# Patient Record
Sex: MALE | Race: WHITE | Employment: STUDENT | URBAN - METROPOLITAN AREA
[De-identification: names, ages, dates, MRNs, and addresses within clinical notes are randomized per-mention and may not be internally consistent; named-entity substitution may affect disease eponyms.]

---

## 2017-01-30 ENCOUNTER — HOSPITAL ENCOUNTER (EMERGENCY)
Facility: CLINIC | Age: 27
Discharge: HOME | End: 2017-01-30
Attending: PHYSICIAN ASSISTANT | Admitting: PHYSICIAN ASSISTANT
Payer: COMMERCIAL

## 2017-01-30 VITALS
DIASTOLIC BLOOD PRESSURE: 78 MMHG | SYSTOLIC BLOOD PRESSURE: 124 MMHG | BODY MASS INDEX: 25.92 KG/M2 | RESPIRATION RATE: 16 BRPM | HEART RATE: 95 BPM | TEMPERATURE: 98.2 F | WEIGHT: 175 LBS | OXYGEN SATURATION: 97 % | HEIGHT: 69 IN

## 2017-01-30 DIAGNOSIS — F11.10 HEROIN ABUSE (H): ICD-10-CM

## 2017-01-30 LAB
ALBUMIN SERPL-MCNC: 4.2 G/DL (ref 3.4–5)
ALP SERPL-CCNC: 93 U/L (ref 40–150)
ALT SERPL W P-5'-P-CCNC: 11 U/L (ref 0–70)
AMPHETAMINES UR QL SCN: ABNORMAL
ANION GAP SERPL CALCULATED.3IONS-SCNC: 8 MMOL/L (ref 3–14)
AST SERPL W P-5'-P-CCNC: 14 U/L (ref 0–45)
BARBITURATES UR QL: ABNORMAL
BASOPHILS # BLD AUTO: 0 10E9/L (ref 0–0.2)
BASOPHILS NFR BLD AUTO: 0.5 %
BENZODIAZ UR QL: ABNORMAL
BILIRUB SERPL-MCNC: 0.5 MG/DL (ref 0.2–1.3)
BUN SERPL-MCNC: 9 MG/DL (ref 7–30)
CALCIUM SERPL-MCNC: 9 MG/DL (ref 8.5–10.1)
CANNABINOIDS UR QL SCN: ABNORMAL
CHLORIDE SERPL-SCNC: 105 MMOL/L (ref 94–109)
CO2 SERPL-SCNC: 26 MMOL/L (ref 20–32)
COCAINE UR QL: ABNORMAL
CREAT SERPL-MCNC: 0.8 MG/DL (ref 0.66–1.25)
DIFFERENTIAL METHOD BLD: ABNORMAL
EOSINOPHIL # BLD AUTO: 0.1 10E9/L (ref 0–0.7)
EOSINOPHIL NFR BLD AUTO: 0.6 %
ERYTHROCYTE [DISTWIDTH] IN BLOOD BY AUTOMATED COUNT: 12.2 % (ref 10–15)
ETHANOL SERPL-MCNC: <0.01 G/DL
GFR SERPL CREATININE-BSD FRML MDRD: ABNORMAL ML/MIN/1.7M2
GLUCOSE SERPL-MCNC: 101 MG/DL (ref 70–99)
HCT VFR BLD AUTO: 39.5 % (ref 40–53)
HGB BLD-MCNC: 13.8 G/DL (ref 13.3–17.7)
IMM GRANULOCYTES # BLD: 0 10E9/L (ref 0–0.4)
IMM GRANULOCYTES NFR BLD: 0.2 %
INTERPRETATION ECG - MUSE: NORMAL
LYMPHOCYTES # BLD AUTO: 1.7 10E9/L (ref 0.8–5.3)
LYMPHOCYTES NFR BLD AUTO: 20.5 %
MCH RBC QN AUTO: 29.8 PG (ref 26.5–33)
MCHC RBC AUTO-ENTMCNC: 34.9 G/DL (ref 31.5–36.5)
MCV RBC AUTO: 85 FL (ref 78–100)
MONOCYTES # BLD AUTO: 0.6 10E9/L (ref 0–1.3)
MONOCYTES NFR BLD AUTO: 7.4 %
NEUTROPHILS # BLD AUTO: 5.7 10E9/L (ref 1.6–8.3)
NEUTROPHILS NFR BLD AUTO: 70.8 %
NRBC # BLD AUTO: 0 10*3/UL
NRBC BLD AUTO-RTO: 0 /100
OPIATES UR QL SCN: ABNORMAL
PCP UR QL SCN: ABNORMAL
PLATELET # BLD AUTO: 419 10E9/L (ref 150–450)
POTASSIUM SERPL-SCNC: 3.8 MMOL/L (ref 3.4–5.3)
PROT SERPL-MCNC: 7.5 G/DL (ref 6.8–8.8)
RBC # BLD AUTO: 4.63 10E12/L (ref 4.4–5.9)
SODIUM SERPL-SCNC: 139 MMOL/L (ref 133–144)
TROPONIN I SERPL-MCNC: NORMAL UG/L (ref 0–0.04)
WBC # BLD AUTO: 8.1 10E9/L (ref 4–11)

## 2017-01-30 PROCEDURE — 25000132 ZZH RX MED GY IP 250 OP 250 PS 637: Performed by: PHYSICIAN ASSISTANT

## 2017-01-30 PROCEDURE — 80053 COMPREHEN METABOLIC PANEL: CPT | Performed by: PHYSICIAN ASSISTANT

## 2017-01-30 PROCEDURE — 80320 DRUG SCREEN QUANTALCOHOLS: CPT | Performed by: PHYSICIAN ASSISTANT

## 2017-01-30 PROCEDURE — 85025 COMPLETE CBC W/AUTO DIFF WBC: CPT | Performed by: PHYSICIAN ASSISTANT

## 2017-01-30 PROCEDURE — 99285 EMERGENCY DEPT VISIT HI MDM: CPT | Mod: 25

## 2017-01-30 PROCEDURE — 25000125 ZZHC RX 250: Performed by: PHYSICIAN ASSISTANT

## 2017-01-30 PROCEDURE — 96361 HYDRATE IV INFUSION ADD-ON: CPT

## 2017-01-30 PROCEDURE — 96374 THER/PROPH/DIAG INJ IV PUSH: CPT

## 2017-01-30 PROCEDURE — 93005 ELECTROCARDIOGRAM TRACING: CPT

## 2017-01-30 PROCEDURE — 84484 ASSAY OF TROPONIN QUANT: CPT | Performed by: PHYSICIAN ASSISTANT

## 2017-01-30 PROCEDURE — 80307 DRUG TEST PRSMV CHEM ANLYZR: CPT | Performed by: PHYSICIAN ASSISTANT

## 2017-01-30 PROCEDURE — 96375 TX/PRO/DX INJ NEW DRUG ADDON: CPT

## 2017-01-30 PROCEDURE — 25000128 H RX IP 250 OP 636: Performed by: PHYSICIAN ASSISTANT

## 2017-01-30 RX ORDER — LORAZEPAM 2 MG/ML
1 INJECTION INTRAMUSCULAR ONCE
Status: COMPLETED | OUTPATIENT
Start: 2017-01-30 | End: 2017-01-30

## 2017-01-30 RX ORDER — LORAZEPAM 1 MG/1
1 TABLET ORAL ONCE
Status: COMPLETED | OUTPATIENT
Start: 2017-01-30 | End: 2017-01-30

## 2017-01-30 RX ORDER — ONDANSETRON 4 MG/1
4 TABLET, ORALLY DISINTEGRATING ORAL EVERY 8 HOURS PRN
Qty: 15 TABLET | Refills: 0 | Status: SHIPPED | OUTPATIENT
Start: 2017-01-30 | End: 2017-02-02

## 2017-01-30 RX ORDER — ONDANSETRON 2 MG/ML
4 INJECTION INTRAMUSCULAR; INTRAVENOUS ONCE
Status: COMPLETED | OUTPATIENT
Start: 2017-01-30 | End: 2017-01-30

## 2017-01-30 RX ADMIN — SODIUM CHLORIDE 1000 ML: 9 INJECTION, SOLUTION INTRAVENOUS at 19:47

## 2017-01-30 RX ADMIN — SODIUM CHLORIDE 1000 ML: 9 INJECTION, SOLUTION INTRAVENOUS at 18:49

## 2017-01-30 RX ADMIN — LORAZEPAM 1 MG: 2 INJECTION INTRAMUSCULAR; INTRAVENOUS at 18:48

## 2017-01-30 RX ADMIN — LORAZEPAM 1 MG: 1 TABLET ORAL at 19:47

## 2017-01-30 RX ADMIN — ONDANSETRON HYDROCHLORIDE 4 MG: 2 SOLUTION INTRAMUSCULAR; INTRAVENOUS at 18:46

## 2017-01-30 ASSESSMENT — ENCOUNTER SYMPTOMS
APPETITE CHANGE: 0
SHORTNESS OF BREATH: 1
FEVER: 0
DIAPHORESIS: 1
WOUND: 0
ABDOMINAL PAIN: 0
COUGH: 0
NAUSEA: 1
VOMITING: 1
COLOR CHANGE: 0

## 2017-01-30 NOTE — ED AVS SNAPSHOT
Emergency Department    [address] [place] [address]    Phone:  [phone]    Fax:  [phone]                                       Ruddy Cabezas   MRN: [id number]    Department:   Emergency Department   Date of Visit:  1/30/2017           After Visit Summary Signature Page     I have received my discharge instructions, and my questions have been answered. I have discussed any challenges I see with this plan with the nurse or doctor.    ..........................................................................................................................................  Patient/Patient Representative Signature      ..........................................................................................................................................  Patient Representative Print Name and Relationship to Patient    ..................................................               ................................................  Date                                            Time    ..........................................................................................................................................  Reviewed by Signature/Title    ...................................................              ..............................................  Date                                                            Time

## 2017-01-30 NOTE — ED PROVIDER NOTES
"  History     Chief Complaint:  Addiction Problem    HPI   Ruddy Cabezas is a 26 year old male who presents to the emergency department today for evaluation of an addiction problem. The patient went to an inpatient treatment facility in [place] and was sober for a total of 190 days afterwards until he relapsed three weeks ago. He has been injecting heroin in both arms everyday for the past two weeks, last using last night. Today he states he feels \"miserable\" and his mother was trying to find him a detox bed. However, she notes [facility] might be able to admit him tomorrow. Since she was unsuccessful, and therefore was referred by [facility] to present to the ED. He states he feels like his heart is going to stop when he falls asleep as well as nausea, vomiting, diaphoresis, and shortness of breath. Per patient's mother, he stated his \"heart is not pumping right\" which prompted the mother to bring him in for evaluation. The [place], however has been living with his mother recently. He denies fever, chest pain, recent alcohol or other drug use, and recent trauma or fall.     Allergies:  No Known Drug Allergies     Medications:    Prilosec  Celexa  Gabapentin  Propranolol      Past Medical History:    Acid reflux  Anxiety     Past Surgical History:    GI surgery  Hernia repair  Soft tissue surgery  EGD combined    Family History:    History reviewed. No pertinent family history.     Social History:  The patient was accompanied to the ED by his mother.  Smoking Status: Current - 0.50 packs/day  Alcohol Use: No  Marital Status:  Single      Review of Systems   Constitutional: Positive for diaphoresis. Negative for fever and appetite change.   Respiratory: Positive for shortness of breath. Negative for cough.    Cardiovascular: Negative for chest pain.        \"heart not pumping right\"   Gastrointestinal: Positive for nausea and vomiting. Negative for abdominal pain.   Skin: Negative for " "color change and wound.   All other systems reviewed and are negative.    Physical Exam     Patient Vitals for the past 24 hrs:   BP Temp Temp src Pulse Resp SpO2 Height Weight   01/30/17 2045 124/78 mmHg - - - - 97 % - -   01/30/17 2030 124/73 mmHg - - - - 96 % - -   01/30/17 2000 125/78 mmHg - - - - 96 % - -   01/30/17 1945 122/72 mmHg - - - - 97 % - -   01/30/17 1930 123/74 mmHg - - - - 95 % - -   01/30/17 1915 123/75 mmHg - - - 16 94 % - -   01/30/17 1900 123/74 mmHg - - - 17 94 % - -   01/30/17 1845 126/77 mmHg - - - 16 - - -   01/30/17 1751 130/87 mmHg 98.2  F (36.8  C) Oral 95 - 94 % 1.753 m (5' 9\") 79.379 kg (175 lb)      Physical Exam  Nursing note and vitals reviewed.     GENERAL: Alert, mild distress, mildly diaphoretic, fatigued appearing, non toxic.   HEENT: Normal conjunctiva. No scleral icterus. MMM.   NECK: Supple.  CARDIAC: Normal rate and regular rhythm. Normal heart sounds. No murmurs, rubs, or gallops appreciated.  PULMONARY: CTA bilaterally. Normal breath sounds. No wheezing, crackles, or rhonchi appreciated.  ABDOMEN: Soft, non distended abdomen. Non-tender. No rebound or guarding.   NEURO: Alert and oriented. Non-focal. No significant tremors of extremities. No tongue fasciculations.   MUSCULOSKELETAL: Normal range of motion. No peripheral edema. No calf tenderness bilaterally.   SKIN: Skin is warm and dry. No rashes. No pallor or jaundice. IV injection sites to bilateral antecubital regions, no surrounding erythema, edema, fluctuance or induration.   PSYCH: Normal affect and mood.       Emergency Department Course     ECG:  ECG taken at 1925, ECG read at 1951  Normal sinus rhythm  Normal ECG  Rate 80 bpm. WI interval 150. QRS duration 80. QT/QTc 374/431. P-R-T axes 56 63 39.    Laboratory:  Laboratory findings were communicated with the patient and mother who voiced understanding of the findings.  Alcohol level blood: <0.01  CBC: WNL. (WBC 8.1, HGB 13.8, )   CMP: Glucose 101(H) o/w WNL " (Creatinine 0.80)  Troponin (Collected 1835): <0.015  Drug Abuse Screen Urine: Positive for cannabinoids and opiates     Interventions:  1846 Zofran 4mg IV  1848 Ativan 1mg IV  1849 NS 1,000mL IV  1947 NS 1,000mL IV  1947 Ativan 1mg IV     Emergency Department Course:  Nursing notes and vitals reviewed.  I performed an exam of the patient as documented above.   1815: I initially examined the patient.   1927: Patient rechecked.   2057: Patient rechecked and both patient and mother updated on the plan of care.   I discussed the treatment plan with the patient. They expressed understanding of this plan and consented to discharge. They will be discharged home with instructions for care and follow up. In addition, the patient will return to the emergency department if their symptoms persist, worsen, if new symptoms arise or if there is any concern.  All questions were answered.'  I personally reviewed the laboratory results with the Patient and mother and answered all related questions prior to discharge.    Impression & Plan      Medical Decision Making:  This is a 26 year old male with a history of heroin abuse, recently relapsed 2-3 three weeks ago after over a year of being sober who presents with mother for concern for heroin use, possible withdrawal. Here, he is afebrile, hemodynamically stable and non toxic appearing. He is not hypertensive or tachycardic to suggest significant withdrawal, however he is having some symptoms of fatigue, nausea with vomiting, and generalized tremors though I do not visualize any tremors on exam. He is not having any tactile hallucinations. He did complain of some mild chest pressure. In regards to that, his EKG does not show any acute ischemic changes and troponin is unremarkable making ACS unlikely. He is not tachycardic or hypoxic and does not have any significant PE risk factors, thus very low suspicion for PE. No clinical evidence of pneumonia. No fevers, murmurs, or other  clinical signs concerning for endocarditis in the setting of his prior IV drug use. No acute electrolyte abnormalities. Urine drug screen positive for opioids and cannabinoids. No other clinical history or signs on exam warranting further ingestion work up. He is not suicidal or homicidal.     He felt some improvement following the above interventions. I discussed plans of detox, however mother notes they are trying to get him into [facility] tomorrow and tentatively have a chemical dependency assessment/evaluation planned for tomorrow morning. Given this, they preferred to take patient home and [facility] I did review with him that his symptoms might recur or become worse and they voiced understanding of this. They still opted to try outpatient management and will return if anything worsens. I felt this was reasonable given he is staying with him mom, she is reliable and they have tentative follow up tomorrow. Will provide prescription for zofran for nausea control. Reviewed reasons to return to ED, including worsening symptoms or any new concerns. The patient and mother were in agreement with plan and discharged in satisfactory condition with all questions answered.     Diagnosis:    ICD-10-CM    1. Heroin abuse, mild withdrawal symptoms F11.10 Drug abuse screen urine     Disposition:   Discharged to home with the below prescription     Discharge Medications:  New Prescriptions    ONDANSETRON (ZOFRAN ODT) 4 MG ODT TAB    Take 1 tablet (4 mg) by mouth every 8 hours as needed       Scribe Disclosure:  Maryann RAMIREZ, am serving as a scribe at 5:56 PM on 1/30/2017 to document services personally performed by Vidya Barnes PA-C, based on my observations and the provider's statements to me.    1/30/2017    EMERGENCY DEPARTMENT        Vidya Barnes PA-C  01/31/17 0053

## 2017-01-30 NOTE — ED AVS SNAPSHOT
Emergency Department    [address] [facility] [address]    Phone:  [phone]    Fax:  [phone]                                       Ruddy Cabezas   MRN: [id number]    Department:   Emergency Department   Date of Visit:  1/30/2017           Patient Information     Date Of Birth          1990        Your diagnoses for this visit were:     Heroin abuse, mild withdrawal symptoms        You were seen by Vidya Barnes PA-C.      Follow-up Information     Follow up with  Emergency Department.    Specialty:  EMERGENCY MEDICINE    Why:  If symptoms worsen    Contact information:    [address] [facility] [address] [phone]        Follow up with chemical dependency assessment In 1 day.        Discharge Instructions       Rest, fluids, zofran as needed for nausea.   Follow up tomorrow as scheduled for chemical dependency assessment and evaluation.         Discharge References/Attachments     HEROIN ADDICTION, TREATING (ENGLISH)    OPIOID WITHDRAWAL (ENGLISH)      24 Hour Appointment Hotline       To make an appointment at any [facility] call [phone] ([phone]). If you don't have a family doctor or clinic, we will help you find one. [facility] clinics are conveniently located to serve the needs of you and your family.             Review of your medicines      START taking        Dose / Directions Last dose taken    ondansetron 4 MG ODT tab   Commonly known as:  ZOFRAN ODT   Dose:  4 mg   Quantity:  15 tablet        Take 1 tablet (4 mg) by mouth every 8 hours as needed   Refills:  0          Our records show that you are taking the medicines listed below. If these are incorrect, please call your family doctor or clinic.        Dose / Directions Last dose taken    CELEXA PO   Dose:  20 mg        Take 20 mg by mouth daily   Refills:  0        GABAPENTIN PO   Dose:  100 mg        Take 100 mg by mouth as needed   Refills:  0        omeprazole  20 MG CR capsule   Commonly known as:  priLOSEC   Dose:  20 mg   Quantity:  30 capsule        Take 1 capsule (20 mg) by mouth daily 30-60 minutes before eating   Refills:  11        PROPRANOLOL HCL PO   Dose:  20 mg        Take 20 mg by mouth as needed for high blood pressure   Refills:  0                Prescriptions were sent or printed at these locations (1 Prescription)                   [facility] [address] [place] [address] [place], [place] - [address]   [address] [facility] [address]    Telephone:  [phone]   Fax:  [phone]   Hours:                  E-Prescribed (1 of 1)         ondansetron (ZOFRAN ODT) 4 MG ODT tab                Procedures and tests performed during your visit     Alcohol level blood    CBC with platelets + differential    Comprehensive metabolic panel    Drug abuse screen urine    EKG 12 lead    Troponin I (now)      Orders Needing Specimen Collection     None      Pending Results     No orders found from 1/29/2017 to 1/31/2017.            Pending Culture Results     No orders found from 1/29/2017 to 1/31/2017.       Test Results from your hospital stay           1/30/2017  7:10 PM - Interface, Flexilab Results      Component Results     Component Value Ref Range & Units Status    Ethanol g/dL <0.01 <0.01 g/dL Final         1/30/2017  6:54 PM - Interface, Flexilab Results      Component Results     Component Value Ref Range & Units Status    WBC 8.1 4.0 - 11.0 10e9/L Final    RBC Count 4.63 4.4 - 5.9 10e12/L Final    Hemoglobin 13.8 13.3 - 17.7 g/dL Final    Hematocrit 39.5 (L) 40.0 - 53.0 % Final    MCV 85 78 - 100 fl Final    MCH 29.8 26.5 - 33.0 pg Final    MCHC 34.9 31.5 - 36.5 g/dL Final    RDW 12.2 10.0 - 15.0 % Final    Platelet Count 419 150 - 450 10e9/L Final    Diff Method Automated Method  Final    % Neutrophils 70.8 % Final    % Lymphocytes 20.5 % Final    % Monocytes 7.4 % Final    % Eosinophils 0.6 % Final    % Basophils 0.5 % Final    %  Immature Granulocytes 0.2 % Final    Nucleated RBCs 0 0 /100 Final    Absolute Neutrophil 5.7 1.6 - 8.3 10e9/L Final    Absolute Lymphocytes 1.7 0.8 - 5.3 10e9/L Final    Absolute Monocytes 0.6 0.0 - 1.3 10e9/L Final    Absolute Eosinophils 0.1 0.0 - 0.7 10e9/L Final    Absolute Basophils 0.0 0.0 - 0.2 10e9/L Final    Abs Immature Granulocytes 0.0 0 - 0.4 10e9/L Final    Absolute Nucleated RBC 0.0  Final         1/30/2017  7:14 PM - Interface, Flexilab Results      Component Results     Component Value Ref Range & Units Status    Sodium 139 133 - 144 mmol/L Final    Potassium 3.8 3.4 - 5.3 mmol/L Final    Chloride 105 94 - 109 mmol/L Final    Carbon Dioxide 26 20 - 32 mmol/L Final    Anion Gap 8 3 - 14 mmol/L Final    Glucose 101 (H) 70 - 99 mg/dL Final    Urea Nitrogen 9 7 - 30 mg/dL Final    Creatinine 0.80 0.66 - 1.25 mg/dL Final    GFR Estimate >90  Non  GFR Calc   >60 mL/min/1.7m2 Final    GFR Estimate If Black >90   GFR Calc   >60 mL/min/1.7m2 Final    Calcium 9.0 8.5 - 10.1 mg/dL Final    Bilirubin Total 0.5 0.2 - 1.3 mg/dL Final    Albumin 4.2 3.4 - 5.0 g/dL Final    Protein Total 7.5 6.8 - 8.8 g/dL Final    Alkaline Phosphatase 93 40 - 150 U/L Final    ALT 11 0 - 70 U/L Final    AST 14 0 - 45 U/L Final         1/30/2017  8:48 PM - Interface, Flexilab Results      Component Results     Component Value Ref Range & Units Status    Amphetamine Qual Urine  NEG Final    Negative   Cutoff for a negative amphetamine is 500 ng/mL or less.      Barbiturates Qual Urine  NEG Final    Negative   Cutoff for a negative barbiturate is 200 ng/mL or less.      Benzodiazepine Qual Urine  NEG Final    Negative   Cutoff for a negative benzodiazepine is 200 ng/mL or less.      Cannabinoids Qual Urine  NEG Final    Positive   Cutoff for a positive cannabinoid is greater than 50 ng/mL. This is an   unconfirmed screening result to be used for medical purposes only.   (A)    Cocaine Qual Urine  NEG  Final    Negative   Cutoff for a negative cocaine is 300 ng/mL or less.      Opiates Qualitative Urine  NEG Final    Positive   Cutoff for a positive opiate is greater than 300 ng/mL. This is an unconfirmed   screening result to be used for medical purposes only.   (A)    PCP Qual Urine  NEG Final    Negative   Cutoff for a negative PCP is 25 ng/mL or less.           1/30/2017  7:14 PM - Interface, Flexilab Results      Component Results     Component Value Ref Range & Units Status    Troponin I ES  0.000 - 0.045 ug/L Final    <0.015  The 99th percentile for upper reference range is 0.045 ug/L.  Troponin values in   the range of 0.045 - 0.120 ug/L may be associated with risks of adverse   clinical events.                  Clinical Quality Measure: Blood Pressure Screening     Your blood pressure was checked while you were in the emergency department today. The last reading we obtained was  BP: 124/78 mmHg . Please read the guidelines below about what these numbers mean and what you should do about them.  If your systolic blood pressure (the top number) is less than 120 and your diastolic blood pressure (the bottom number) is less than 80, then your blood pressure is normal. There is nothing more that you need to do about it.  If your systolic blood pressure (the top number) is 120-139 or your diastolic blood pressure (the bottom number) is 80-89, your blood pressure may be higher than it should be. You should have your blood pressure rechecked within a year by a primary care provider.  If your systolic blood pressure (the top number) is 140 or greater or your diastolic blood pressure (the bottom number) is 90 or greater, you may have high blood pressure. High blood pressure is treatable, but if left untreated over time it can put you at risk for heart attack, stroke, or kidney failure. You should have your blood pressure rechecked by a primary care provider within the next 4 weeks.  If your provider in the emergency  "department today gave you specific instructions to follow-up with your doctor or provider even sooner than that, you should follow that instruction and not wait for up to 4 weeks for your follow-up visit.        Thank you for choosing [facility]       Thank you for choosing [facility] for your care. Our goal is always to provide you with excellent care. Hearing back from our patients is one way we can continue to improve our services. Please take a few minutes to complete the written survey that you may receive in the mail after you visit with us. Thank you!        INDOMharPublishThis Information     [employer]lo lets you send messages to your doctor, view your test results, renew your prescriptions, schedule appointments and more. To sign up, go to [email or web address] . Click on \"Log in\" on the left side of the screen, which will take you to the Welcome page. Then click on \"Sign up Now\" on the right side of the page.     You will be asked to enter the access code listed below, as well as some personal information. Please follow the directions to create your username and password.     Your access code is: [email or web address]  Expires: 2017  8:57 PM     Your access code will  in 90 days. If you need help or a new code, please call your [facility] clinic or [phone].        Care EveryWhere ID     This is your Care EveryWhere ID. This could be used by other organizations to access your [facility] medical records  [id number]        After Visit Summary       This is your record. Keep this with you and show to your community pharmacist(s) and doctor(s) at your next visit.                  "

## 2017-01-31 NOTE — DISCHARGE INSTRUCTIONS
Rest, fluids, zofran as needed for nausea.   Follow up tomorrow as scheduled for chemical dependency assessment and evaluation.